# Patient Record
Sex: MALE | Race: WHITE
[De-identification: names, ages, dates, MRNs, and addresses within clinical notes are randomized per-mention and may not be internally consistent; named-entity substitution may affect disease eponyms.]

---

## 2018-06-14 ENCOUNTER — HOSPITAL ENCOUNTER (OUTPATIENT)
Dept: HOSPITAL 59 - HOP | Age: 73
Discharge: HOME | End: 2018-06-14
Attending: INTERNAL MEDICINE
Payer: MEDICARE

## 2018-06-14 DIAGNOSIS — D12.5: ICD-10-CM

## 2018-06-14 DIAGNOSIS — D64.9: Primary | ICD-10-CM

## 2018-06-14 DIAGNOSIS — Z79.84: ICD-10-CM

## 2018-06-14 DIAGNOSIS — K57.30: ICD-10-CM

## 2018-06-14 DIAGNOSIS — K21.9: ICD-10-CM

## 2018-06-14 DIAGNOSIS — G62.9: ICD-10-CM

## 2018-06-14 DIAGNOSIS — E78.00: ICD-10-CM

## 2018-06-14 DIAGNOSIS — I10: ICD-10-CM

## 2018-06-14 DIAGNOSIS — D12.4: ICD-10-CM

## 2018-06-14 DIAGNOSIS — E11.9: ICD-10-CM

## 2018-06-14 DIAGNOSIS — K62.1: ICD-10-CM

## 2018-06-14 DIAGNOSIS — D12.3: ICD-10-CM

## 2018-06-15 NOTE — OPERATIVE NOTE
DATE OF SURGERY:  06/14/2018



OPERATION: 

1. ESOPHAGOGASTRODUODENOSCOPY.

2. COLONOSCOPY with cold and hot snare polypectomies and ink injection. 



PREOPERATIVE DIAGNOSIS: Anemia. 



POSTOPERATIVE DIAGNOSES:

1. Normal upper endoscopy. 

2. Colonic diverticulosis, left side greater than right. 

3. Multiple colon polyp. 



ESTIMATED BLOOD LOSS: Minimal. 



SPECIMENS: Transverse, descending, sigmoid, and rectal polyps. 



COMPLICATIONS: None apparent. 



PREPARATION QUALITY: Good. 



PROCEDURE: After informed consent was obtained from the patient, he was placed 
in the left lateral decubitus position in the endoscopy suite, sedated and 
monitored by the department of anesthesia. A well-lubricated ZZX041 gastroscope 
was placed in the posterior oropharynx and under direct visualization passed to 
the proximal esophagus. The endoscope was advanced through the proximal, mid, 
and distal esophagus. The GE junction and esophagus were unremarkable. No ulcers
, erosions, strictures, varices, or mass lesions were seen. The gastric body, 
antrum, pylorus, duodenal bulb and sweep were unremarkable. No changes, fresh 
or old blood, or celiac sprue were noted in the small bowel. No ulcers or mass 
lesions were seen. J-turn views of the proximal stomach were unrevealing. The 
endoscope was then straightened and retracted from the patient with no new 
findings noted. 



Digital rectal exam revealed no palpable mass. A well-lubricated [id number] 
colonoscope was inserted into the rectum and advanced through a severely 
diverticulated sigmoid colon to the descending colon, transverse colon, 
ascending colon, ultimately to the cecum. Preparation quality was good. The 
cecum and ascending colon were unremarkable. There were scattered diverticula 
in the ascending colon. 



There were scattered diverticula in the ascending colon.



In the transverse colon, there was a 5 mm sessile polyp removed with a cold 
snare.  There was a similar polyp in the descending colon, which was removed 
with a cold snare.  Each polyp was retrieved without difficulty.  There was 
minimal bleeding at the sites.  In the sigmoid colon, there was a sessile polyp 
which was removed with a hot snare as well as pedunculated polyp in that area 
removed with a cold snare.  The area was inked for need for recurrent 
visualization or perhaps even if surgical resection becomes necessary.  There 
is minimal to no bleeding at the sites.  In the rectum, there were 2 sessile 
polyps each measuring between 5 and 6 mm.  One was removed with a cold snare, 
the other with a hot snare.  Minimal bleeding was noted at the cold snare site.
  The hot snare site was with no bleeding noted.  J-turn views of the anorectum 
were otherwise unremarkable.  There were also scattered diverticula in the 
sigmoid colon which were quite numerous.



RECOMMENDATIONS:  Patient should follow a soft, low-fiber diet for the next 2 
weeks and a high-fiber diet thereafter.  He will require repeat colonoscopy in 
1 to 3 years pending tissue histology. 



As always, thank you for allowing me to participate in the health care of your 
patients.

CC: Osmany Archibald, DO [facility]